# Patient Record
(demographics unavailable — no encounter records)

---

## 2025-01-24 NOTE — DISCUSSION/SUMMARY
[Normal Growth] : growth [Normal Development] : developmental [No Elimination Concerns] : elimination [Continue Regimen] : feeding [No Skin Concerns] : skin [Normal Sleep Pattern] : sleep [None] : no known medical problems [Anticipatory Guidance Given] : Anticipatory guidance addressed as per the history of present illness section [No Vaccines] : no vaccines needed [No Medications] : ~He/She~ is not on any medications [Parent/Guardian] : Parent/Guardian [FreeTextEntry1] : Recommend exclusive breastfeeding, 8-12 feedings per day.  Mother should continue prenatal vitamins and avoid alcohol. If formula is needed, recommend iron-fortified formulations every 2-3 hrs.  When in car, patient should be in rear-facing car seat in back seat.  Air dry umbilical stump.  Put baby to sleep on back, in own crib with no loose or soft bedding.  Limit baby's exposure to others, especially those with fever or unknown vaccine status. TCB= 9.6 F/u in 3 days for weight check, earlier with concerns

## 2025-01-24 NOTE — HISTORY OF PRESENT ILLNESS
[Born at ___ Wks Gestation] : The patient was born at [unfilled] weeks gestation [] : via normal spontaneous vaginal delivery [[facility]] : at [facility] [(1) _____] : [unfilled] [(5) _____] : [unfilled] [None] : There were no delivery complications [BW: _____] : weight of [unfilled] [Length: _____] : length of [unfilled] [HC: _____] : head circumference of [unfilled] [DW: _____] : Discharge weight was [unfilled] [Age: ___] : [unfilled] year old mother [G: ___] : G [unfilled] [P: ___] : P [unfilled] [Significant Hx: ____] : The mother's  medical history is significant for [unfilled] [Rubella (Immune)] : Rubella immune [GDM] : GDM [AMA] : AMA [TcB: _____] : Transcutaneous Bilirubin [unfilled] mg/dL [Phototherapy Threshold: _____] : Phototherapy level per Bilitool [unfilled] (mg/dL) [Yes] : Yes [Breast milk] : breast milk [Hours between feeds ___] : Child is fed every [unfilled] hours [Normal] : Normal [___ voids per day] : [unfilled] voids per day [Frequency of stools: ___] : Frequency of stools: [unfilled]  stools [per day] : per day. [Green/brown] : green/brown [Loose] : loose consistency [In Bassinet/Crib] : sleeps in bassinet/crib [On back] : sleeps on back [No] : No cigarette smoke exposure [Water heater temperature set at <120 degrees F] : Water heater temperature set at <120 degrees F [Rear facing car seat in back seat] : Rear facing car seat in back seat [Carbon Monoxide Detectors] : Carbon monoxide detectors at home [Smoke Detectors] : Smoke detectors at home. [Hepatitis B Vaccine Given] : Hepatitis B vaccine given [Nirsevimab Given] : Nirsevimab given  [NO] : No [RSV vaccine] : RSV vaccine not received by mother at least 14 days prior to delivery [HepBsAG] : HepBsAg negative [HIV] : HIV negative [HepC] : Hepatitis C negative [GBS] : GBS negative [VDRL/RPR (Reactive)] : VDRL/RPR nonreactive [] : negative [FreeTextEntry9] : B+ [de-identified] : 24 [Co-sleeping] : no co-sleeping [Loose bedding, pillow, toys, and/or bumpers in crib] : no loose bedding, pillow, toys, and/or bumpers in crib [Pacifier] : Not using pacifier [Exposure to electronic nicotine delivery system] : No exposure to electronic nicotine delivery system

## 2025-01-24 NOTE — PHYSICAL EXAM
[Alert] : alert [Normocephalic] : normocephalic [Flat Open Anterior [place]] : flat open anterior fontanelle [PERRL] : PERRL [Red Reflex Bilateral] : red reflex bilateral [Normally Placed Ears] : normally placed ears [Auricles Well Formed] : auricles well formed [Clear Tympanic membranes] : clear tympanic membranes [Light reflex present] : light reflex present [Bony structures visible] : bony structures visible [Patent Auditory Canal] : patent auditory canal [Nares Patent] : nares patent [Palate Intact] : palate intact [Uvula Midline] : uvula midline [Supple, full passive range of motion] : supple, full passive range of motion [Symmetric Chest Rise] : symmetric chest rise [Clear to Auscultation Bilaterally] : clear to auscultation bilaterally [Regular Rate and Rhythm] : regular rate and rhythm [S1, S2 present] : S1, S2 present [+2 Femoral Pulses] : +2 femoral pulses [Soft] : soft [Bowel Sounds] : bowel sounds present [Umbilical Stump Dry, Clean, Intact] : umbilical stump dry, clean, intact [Normal external genitailia] : normal external genitalia [Circumcised] : circumcised [Central Urethral Opening] : central urethral opening [Testicles Descended Bilaterally] : testicles descended bilaterally [Patent] : patent [Normally Placed] : normally placed [No Abnormal Lymph Nodes Palpated] : no abnormal lymph nodes palpated [Symmetric Flexed Extremities] : symmetric flexed extremities [Startle Reflex] : startle reflex present [Suck Reflex] : suck reflex present [Rooting] : rooting reflex present [Palmar Grasp] : palmar grasp present [Plantar Grasp] : plantar reflex present [Symmetric Maury] : symmetric [place] [Acute Distress] : no acute distress [Icteric sclera] : nonicteric sclera [Discharge] : no discharge [Palpable Masses] : no palpable masses [Murmurs] : no murmurs [Tender] : nontender [Distended] : not distended [Hepatomegaly] : no hepatomegaly [Splenomegaly] : no splenomegaly [Norwood-Ortolani] : negative Norwood-Ortolani [Spinal Dimple] : no spinal dimple [Tuft of Hair] : no tuft of hair [Jaundice] : jaundice [FreeTextEntry6] : circ WNL

## 2025-01-28 NOTE — HISTORY OF PRESENT ILLNESS
[de-identified] : f/u re: weight and feeding concerns [FreeTextEntry6] :  Pt her for recheck   diet: BF excl.  Some pain with latch and some engorgement issues   nl UO/BM    sleeps 3-4 hrs

## 2025-01-28 NOTE — PLAN
[TextEntry] : Feed on demand. Mom plans to f/u with telelactation services. Recheck 1 week. T/S Vit D. Plans to see uro to f/u re: h/o pyelectasis

## 2025-01-28 NOTE — HISTORY OF PRESENT ILLNESS
[de-identified] : f/u re: weight and feeding concerns [FreeTextEntry6] :  Pt her for recheck   diet: BF excl.  Some pain with latch and some engorgement issues   nl UO/BM    sleeps 3-4 hrs

## 2025-02-04 NOTE — HISTORY OF PRESENT ILLNESS
[de-identified] : f/u re: weight and feeding concerns [FreeTextEntry6] :  Pt here for recheck   diet: BF excl   Still some pain with latch   + TVS   nl UO/BM  s/p uro  U/S showed mild b/l HN. To f/u 3 mths

## 2025-02-04 NOTE — HISTORY OF PRESENT ILLNESS
[de-identified] : f/u re: weight and feeding concerns [FreeTextEntry6] :  Pt here for recheck   diet: BF excl   Still some pain with latch   + TVS   nl UO/BM  s/p uro  U/S showed mild b/l HN. To f/u 3 mths

## 2025-02-04 NOTE — PHYSICAL EXAM
[NL] : soft, nontender, nondistended, normal bowel sounds, no hepatosplenomegaly [de-identified] : + ling frenulum [FreeTextEntry6] : + left hydrocele [de-identified] : hips nl [de-identified] : no jaundice

## 2025-02-04 NOTE — PLAN
[TextEntry] : [facility] @ age 1 mth. Uro f/u as planned. Consider lactation consult. Disc indications for frenotomy

## 2025-02-04 NOTE — PHYSICAL EXAM
[NL] : soft, nontender, nondistended, normal bowel sounds, no hepatosplenomegaly [de-identified] : + ling frenulum [FreeTextEntry6] : + left hydrocele [de-identified] : hips nl [de-identified] : no jaundice

## 2025-02-04 NOTE — PLAN
[TextEntry] : [facility] @ age 1 mth. Uro f/u as planned. Consider lactation consult. Disc indications for frenotomy

## 2025-02-05 NOTE — PHYSICAL EXAM
[NL] : soft, nontender, nondistended, normal bowel sounds, no hepatosplenomegaly [FreeTextEntry6] : + reducible left ing swelling. + b/l hydrocele L>R

## 2025-02-05 NOTE — HISTORY OF PRESENT ILLNESS
[de-identified] : ? hernia [FreeTextEntry6] :  Pt seen yest for wt check appt. Pt with h/o hydrocele   Mom noted after OV a swelling in left groin. No clear pain. Eat nl. No V Pt has seen uro re: HN

## 2025-02-13 NOTE — CONSULT LETTER
[Dear  ___] : Dear  [unfilled], [Consult Letter:] : I had the pleasure of evaluating your patient, [unfilled]. [Please see my note below.] : Please see my note below. [Consult Closing:] : Thank you very much for allowing me to participate in the care of this patient.  If you have any questions, please do not hesitate to contact me. [Sincerely,] : Sincerely, [FreeTextEntry2] : Ger Vasquez MD [FreeTextEntry3] : Israel Romero MD Division of Pediatric, General, Thoracic and Endoscopic Surgery [facility]

## 2025-02-13 NOTE — ASSESSMENT
[FreeTextEntry1] : Blake is a 22 day old boy presenting with concerns of a left inguinal swelling. He is otherwise healthy and doing well without symptoms at this point. I counseled mom and dad and informed them that on exam today, the left testicle is retractile and comes down to the base of the scrotum; there is no obvious inguinal hernia defect appreciated. Given the history provided by the family, I reviewed the differentials including possible left inguinal hernia versus retractile testicle. I educated them about indirect/direct inguinal hernias as well as communicating/non-communicating hydroceles. I then reviewed the role of surgery to repair an inguinal hernia defect including a laparoscopic left, possible right, inguinal hernia repair, which would be performed should a hernia be identified in Blake's case. I discussed the indications, risks, benefits and alternatives to the procedure. The risks discussed included but were not limited to bleeding, infection, injury to intra-abdominal/pelvic contents, injury to spermatic cord/testicular loss, postoperative hydrocele and hernia recurrence. Postoperative expectations were discussed. I counseled them about the possibility of developing an incarcerated hernia and they know to bring Blake to the emergency room with any concerns. Mom and dad verbalized their understanding and we have agreed to monitor the groin moving forward and to have the family send me a photograph the next time they appreciate any obvious asymmetry in the left groin for me to assess if there is an obvious hernia present. They have agreed to proceed with surgical repair should a hernia be identified. They have my information and know to contact me sooner with any questions or concerns.

## 2025-02-13 NOTE — CONSULT LETTER
[Dear  ___] : Dear  [unfilled], [Consult Letter:] : I had the pleasure of evaluating your patient, [unfilled]. [Please see my note below.] : Please see my note below. [Consult Closing:] : Thank you very much for allowing me to participate in the care of this patient.  If you have any questions, please do not hesitate to contact me. [Sincerely,] : Sincerely, [FreeTextEntry2] : Ger Vasquez MD [FreeTextEntry3] : Israel Romero MD Division of Pediatric, General, Thoracic and Endoscopic Surgery [facility]

## 2025-02-13 NOTE — ADDENDUM
[FreeTextEntry1] : Documented by Octaviano Pickering acting as a scribe for Dr. Romero on 02/13/2025.   All medical record entries made by the Scribe were at my, Dr. Romero, direction and personally dictated by me on 02/13/2025. I have reviewed the chart and agree that the record accurately reflects my personal performances of the history, physical exam, assessment and plan. I have also personally directed, reviewed, and agree with the instructions.

## 2025-02-13 NOTE — PHYSICAL EXAM
[NL] : grossly intact [TextBox_67] : The left testicle is retractile, but comes down to the base of the scrotum, no obvious inguinal hernia defect identified

## 2025-02-13 NOTE — HISTORY OF PRESENT ILLNESS
[FreeTextEntry1] : Blake is a full term 22 day old baby boy here today to be evaluated for concerns of a left inguinal hernia. The parents note that concerns of a hydrocele were raised at birth and at their visit with the pediatrician, there was a left inguinal hernia that was reportedly reduced. Since then, the family has appreciated intermittent swelling in the right groin and deny any signs of incarceration. No swelling has been appreciated on the right side. Blake is otherwise feeding well without emesis. His bowel movements remain normal and he is making normal wet diapers. No recent fevers noted. To note, Blake was prenatally diagnosed with hydronephrosis and is followed by a urologist at an OSH.

## 2025-02-13 NOTE — CONSULT LETTER
[Dear  ___] : Dear  [unfilled], [Consult Letter:] : I had the pleasure of evaluating your patient, [unfilled]. [Please see my note below.] : Please see my note below. [Consult Closing:] : Thank you very much for allowing me to participate in the care of this patient.  If you have any questions, please do not hesitate to contact me. [Sincerely,] : Sincerely, [FreeTextEntry2] : Ger Vasquez MD [FreeTextEntry3] : Israel Romero MD Division of Pediatric, General, Thoracic and Endoscopic Surgery [facility]

## 2025-02-18 NOTE — REASON FOR VISIT
[Initial - Scheduled] : an initial, scheduled visit with concerns of [Inguinal Hernia] : inguinal hernia [Patient] : patient [Parents] : parents [Other: ____] : [unfilled]

## 2025-02-25 NOTE — PLAN
[TextEntry] : [facility] 1 mth. Disc diet, sleep, development. Uro f/u April re: HN. Surg f/u prn

## 2025-02-25 NOTE — HISTORY OF PRESENT ILLNESS
[Parents] : parents [Normal] : Normal [FreeTextEntry7] : s/p surg consult. IH not found- to monitor [de-identified] : BF+EBM (4-5 oz) [FreeTextEntry3] : 3 hrs

## 2025-02-25 NOTE — HISTORY OF PRESENT ILLNESS
[Parents] : parents [Normal] : Normal [FreeTextEntry7] : s/p surg consult. IH not found- to monitor [de-identified] : BF+EBM (4-5 oz) [FreeTextEntry3] : 3 hrs

## 2025-02-25 NOTE — PHYSICAL EXAM
[Alert] : alert [Acute Distress] : no acute distress [Normocephalic] : normocephalic [Flat Open Anterior [place]] : flat open anterior fontanelle [PERRL] : PERRL [Red Reflex Bilateral] : red reflex bilateral [Normally Placed Ears] : normally placed ears [Auricles Well Formed] : auricles well formed [Clear Tympanic membranes] : clear tympanic membranes [Light reflex present] : light reflex present [Bony landmarks visible] : bony landmarks visible [Discharge] : no discharge [Nares Patent] : nares patent [Palate Intact] : palate intact [Uvula Midline] : uvula midline [Supple, full passive range of motion] : supple, full passive range of motion [Palpable Masses] : no palpable masses [Symmetric Chest Rise] : symmetric chest rise [Clear to Auscultation Bilaterally] : clear to auscultation bilaterally [Regular Rate and Rhythm] : regular rate and rhythm [S1, S2 present] : S1, S2 present [Murmurs] : no murmurs [+2 Femoral Pulses] : +2 femoral pulses [Soft] : soft [Tender] : nontender [Distended] : not distended [Bowel Sounds] : bowel sounds present [Hepatomegaly] : no hepatomegaly [Splenomegaly] : no splenomegaly [Normal external genitailia] : normal external genitalia [Central Urethral Opening] : central urethral opening [Testicles Descended Bilaterally] : testicles descended bilaterally [Normally Placed] : normally placed [No Abnormal Lymph Nodes Palpated] : no abnormal lymph nodes palpated [Norwood-Ortolani] : negative [place]wood-Ortolani [Symmetric Flexed Extremities] : symmetric flexed extremities [Spinal Dimple] : no spinal dimple [Tuft of Hair] : no tuft of hair [Startle Reflex] : startle reflex present [Suck Reflex] : suck reflex present [Rooting] : rooting reflex present [Palmar Grasp] : palmar grasp reflex present [Plantar Grasp] : plantar grasp reflex present [Symmetric Maury] : symmetric [place] [Jaundice] : no jaundice [Rash and/or lesion present] : no rash/lesion [FreeTextEntry6] : no ing swelling present. + hydrocele R>L

## 2025-02-25 NOTE — PHYSICAL EXAM
[Alert] : alert [Acute Distress] : no acute distress [Normocephalic] : normocephalic [Flat Open Anterior [place]] : flat open anterior fontanelle [PERRL] : PERRL [Red Reflex Bilateral] : red reflex bilateral [Normally Placed Ears] : normally placed ears [Auricles Well Formed] : auricles well formed [Clear Tympanic membranes] : clear tympanic membranes [Light reflex present] : light reflex present [Bony landmarks visible] : bony landmarks visible [Discharge] : no discharge [Nares Patent] : nares patent [Palate Intact] : palate intact [Uvula Midline] : uvula midline [Supple, full passive range of motion] : supple, full passive range of motion [Palpable Masses] : no palpable masses [Symmetric Chest Rise] : symmetric chest rise [Clear to Auscultation Bilaterally] : clear to auscultation bilaterally [Regular Rate and Rhythm] : regular rate and rhythm [S1, S2 present] : S1, S2 present [Murmurs] : no murmurs [+2 Femoral Pulses] : +2 femoral pulses [Soft] : soft [Tender] : nontender [Distended] : not distended [Bowel Sounds] : bowel sounds present [Hepatomegaly] : no hepatomegaly [Splenomegaly] : no splenomegaly [Normal external genitailia] : normal external genitalia [Central Urethral Opening] : central urethral opening [Testicles Descended Bilaterally] : testicles descended bilaterally [Normally Placed] : normally placed [No Abnormal Lymph Nodes Palpated] : no abnormal lymph nodes palpated [Norwood-Ortolani] : negative Norwood-Ortolani [Symmetric Flexed Extremities] : symmetric flexed extremities [Spinal Dimple] : no spinal dimple [Tuft of Hair] : no tuft of hair [Startle Reflex] : startle reflex present [Suck Reflex] : suck reflex present [Rooting] : rooting reflex present [Palmar Grasp] : palmar grasp reflex present [Plantar Grasp] : plantar grasp reflex present [Symmetric Maury] : symmetric [place] [Jaundice] : no jaundice [Rash and/or lesion present] : no rash/lesion [FreeTextEntry6] : no ing swelling present. + hydrocele R>L

## 2025-02-25 NOTE — PLAN
[TextEntry] : [facility] 1 mth. Disc diet, sleep, development. Uro f/u April re: HN. Surg f/u prn

## 2025-03-25 NOTE — PLAN
[TextEntry] : Disc diet, sleep, development. Increase famotidine to 0.4 ml bid. Disc home neck stretching; to f/u in 1 mth if not better. Uro f/u in April as planned re: HN

## 2025-03-25 NOTE — PHYSICAL EXAM
[Alert] : alert [Acute Distress] : no acute distress [Normocephalic] : normocephalic [Flat Open Anterior [place]] : flat open anterior fontanelle [PERRL] : PERRL [Red Reflex Bilateral] : red reflex bilateral [Normally Placed Ears] : normally placed ears [Auricles Well Formed] : auricles well formed [Clear Tympanic membranes] : clear tympanic membranes [Light reflex present] : light reflex present [Bony landmarks visible] : bony landmarks visible [Discharge] : no discharge [Nares Patent] : nares patent [Palate Intact] : palate intact [Uvula Midline] : uvula midline [Supple, full passive range of motion] : supple, full passive range of motion [Palpable Masses] : no palpable masses [Symmetric Chest Rise] : symmetric chest rise [Clear to Auscultation Bilaterally] : clear to auscultation bilaterally [Regular Rate and Rhythm] : regular rate and rhythm [S1, S2 present] : S1, S2 present [Murmurs] : no murmurs [+2 Femoral Pulses] : +2 femoral pulses [Soft] : soft [Tender] : nontender [Distended] : not distended [Bowel Sounds] : bowel sounds present [Hepatomegaly] : no hepatomegaly [Splenomegaly] : no splenomegaly [Normal external genitailia] : normal external genitalia [Central Urethral Opening] : central urethral opening [Testicles Descended Bilaterally] : testicles descended bilaterally [Normally Placed] : normally placed [No Abnormal Lymph Nodes Palpated] : no abnormal lymph nodes palpated [Norwood-Ortolani] : negative [place]-Ortolani [Symmetric Flexed Extremities] : symmetric flexed extremities [Spinal Dimple] : no spinal dimple [Tuft of Hair] : no tuft of hair [Startle Reflex] : startle reflex present [Rooting] : rooting reflex present [Suck Reflex] : suck reflex present [Palmar Grasp] : palmar grasp reflex present [Plantar Grasp] : plantar grasp reflex present [Symmetric Maury] : symmetric [place] [Rash and/or lesion present] : no rash/lesion [FreeTextEntry2] : mild right PO flattening

## 2025-03-25 NOTE — HISTORY OF PRESENT ILLNESS
[Mother] : mother [Normal] : Normal [de-identified] : EBM 4-5 oz. Rare BF [de-identified] : Mom does report right sided neck preference [FreeTextEntry3] : 2.5-3.5 hrs [FreeTextEntry1] :  -h/o GERD. Sx's better since starting famotidine -no recurrence of inguinal bulge

## 2025-03-25 NOTE — PHYSICAL EXAM
[Alert] : alert [Acute Distress] : no acute distress [Normocephalic] : normocephalic [Flat Open Anterior [place]] : flat open anterior fontanelle [PERRL] : PERRL [Red Reflex Bilateral] : red reflex bilateral [Normally Placed Ears] : normally placed ears [Auricles Well Formed] : auricles well formed [Clear Tympanic membranes] : clear tympanic membranes [Light reflex present] : light reflex present [Bony landmarks visible] : bony landmarks visible [Discharge] : no discharge [Nares Patent] : nares patent [Palate Intact] : palate intact [Uvula Midline] : uvula midline [Supple, full passive range of motion] : supple, full passive range of motion [Palpable Masses] : no palpable masses [Symmetric Chest Rise] : symmetric chest rise [Regular Rate and Rhythm] : regular rate and rhythm [Clear to Auscultation Bilaterally] : clear to auscultation bilaterally [S1, S2 present] : S1, S2 present [Murmurs] : no murmurs [+2 Femoral Pulses] : +2 femoral pulses [Soft] : soft [Tender] : nontender [Distended] : not distended [Bowel Sounds] : bowel sounds present [Hepatomegaly] : no hepatomegaly [Splenomegaly] : no splenomegaly [Normal external genitailia] : normal external genitalia [Central Urethral Opening] : central urethral opening [Testicles Descended Bilaterally] : testicles descended bilaterally [Normally Placed] : normally placed [No Abnormal Lymph Nodes Palpated] : no abnormal lymph nodes palpated [Norwood-Ortolani] : negative [place]wood-Ortolani [Symmetric Flexed Extremities] : symmetric flexed extremities [Spinal Dimple] : no spinal dimple [Tuft of Hair] : no tuft of hair [Startle Reflex] : startle reflex present [Suck Reflex] : suck reflex present [Rooting] : rooting reflex present [Palmar Grasp] : palmar grasp reflex present [Plantar Grasp] : plantar grasp reflex present [Symmetric Maury] : symmetric [place] [Rash and/or lesion present] : no rash/lesion [FreeTextEntry2] : mild right PO flattening

## 2025-03-25 NOTE — HISTORY OF PRESENT ILLNESS
DPD lab not back yet - likely will not be back until next week.     Reviewed with Dr. Friedell. We would prefer not to delay another week to wait on this.   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 Keep leucovorin. Keep oxaliplatin full-dose. Will add Neulasta on-pro (will get day 2).     Called pt to review plan, he's in agreement.   He is doing well this week. No concerns.   /80s on his full-dose antihypertensives.     He will come in tomorrow for labs + C2. He will come in day 4 for IVF.   Advised Claritin for possible neulasta musculoskeletal side effects.   Advised he pre-emptively start antimetics day 3 and take scheduled for a few days, PRN thereafter. Imodium for diarrhea.  Push fluids/calories and call in or go to ED with any recurrent hypotension/dehdyration.  He has parameters for his antihypertensive dosing/when to hold by his PCP, he will monitor BP twice/day through cycle.     Return in 2 weeks with me ahead of C3. If his DPD testing turns out to not be deficient and he does well this cycle, will consider going back up on the infusional 5FU dose +/- reintroducing the 5FU bolus with next cycle    [Mother] : mother [Normal] : Normal [de-identified] : EBM 4-5 oz. Rare BF [de-identified] : Mom does report right sided neck preference [FreeTextEntry3] : 2.5-3.5 hrs [FreeTextEntry1] :  -h/o GERD. Sx's better since starting famotidine -no recurrence of inguinal bulge

## 2025-05-22 NOTE — DEVELOPMENTAL MILESTONES
[FreeTextEntry1] : + push up, does not roll. laughs. + open hand [Passed] : passed [FreeTextEntry2] : 3

## 2025-05-22 NOTE — HISTORY OF PRESENT ILLNESS
[Mother] : mother [Formula ___ oz/feed] : [unfilled] oz of formula per feed [Normal] : Normal [FreeTextEntry3] : eats x 1 [FreeTextEntry1] :  -h/o giovanni HN. Due for uro f/u  has periodic swelling of scrotum c/w hydrocele -GERD sx's better -head flattening improved. neck movement symmetric

## 2025-05-22 NOTE — PHYSICAL EXAM
[Alert] : alert [Normocephalic] : normocephalic [Flat Open Anterior [place]] : flat open anterior fontanelle [Red Reflex] : red reflex bilateral [PERRL] : PERRL [Normally Placed Ears] : normally placed ears [Auricles Well Formed] : auricles well formed [Clear Tympanic membranes] : clear tympanic membranes [Light reflex present] : light reflex present [Bony landmarks visible] : bony landmarks visible [Nares Patent] : nares patent [Palate Intact] : palate intact [Uvula Midline] : uvula midline [Symmetric Chest Rise] : symmetric chest rise [Clear to Auscultation Bilaterally] : clear to auscultation bilaterally [Regular Rate and Rhythm] : regular rate and rhythm [S1, S2 present] : S1, S2 present [+2 Femoral Pulses] : (+) 2 femoral pulses [Soft] : soft [Bowel Sounds] : bowel sounds present [Central Urethral Opening] : central urethral opening [Testicles Descended] : testicles descended bilaterally [Patent] : patent [Normally Placed] : normally placed [No Abnormal Lymph Nodes Palpated] : no abnormal lymph nodes palpated [Startle Reflex] : startle reflex present [Plantar Grasp] : plantar grasp reflex present [Symmetric Maury] : symmetric maury [Acute Distress] : no acute distress [Discharge] : no discharge [Palpable Masses] : no palpable masses [Murmurs] : no murmurs [Tender] : nontender [Distended] : nondistended [Hepatomegaly] : no hepatomegaly [Splenomegaly] : no splenomegaly [Norwood-Ortolani] : negative Norwood-Ortolani [Allis Sign] : negative Allis sign [Spinal Dimple] : no spinal dimple [Tuft of Hair] : no tuft of hair [Rash or Lesions] : no rash/lesions [FreeTextEntry2] : minimal right PO flattening

## 2025-05-22 NOTE — PLAN
[TextEntry] : [facility] 2 mths. Disc diet, sleep, development. Uro as planned. Trial weaning off famotidine

## 2025-07-29 NOTE — HISTORY OF PRESENT ILLNESS
[Mother] : mother [Normal] : Normal [de-identified] : GERD better. Stopped famotidine [de-identified] : F 30 oz. 2 meals [de-identified] : thru [FreeTextEntry1] :  -h/o HN and hydrocele   Due for uro f/u @ age 1 yr

## 2025-07-29 NOTE — PLAN
[TextEntry] : [facility] 3 mths. Disc diet, sleep, development, oral health, safety. Uro f/u as planned

## 2025-07-29 NOTE — PHYSICAL EXAM
[Alert] : alert [Acute Distress] : no acute distress [Normocephalic] : normocephalic [Flat Open Anterior [place]] : flat open anterior fontanelle [Red Reflex] : red reflex bilateral [PERRL] : PERRL [Normally Placed Ears] : normally placed ears [Auricles Well Formed] : auricles well formed [Clear Tympanic membranes] : clear tympanic membranes [Light reflex present] : light reflex present [Bony landmarks visible] : bony landmarks visible [Discharge] : no discharge [Nares Patent] : nares patent [Palate Intact] : palate intact [Uvula Midline] : uvula midline [Tooth Eruption] : no tooth eruption [Supple, full passive range of motion] : supple, full passive range of motion [Palpable Masses] : no palpable masses [Symmetric Chest Rise] : symmetric chest rise [Clear to Auscultation Bilaterally] : clear to auscultation bilaterally [Regular Rate and Rhythm] : regular rate and rhythm [S1, S2 present] : S1, S2 present [Murmurs] : no murmurs [+2 Femoral Pulses] : (+) 2 femoral pulses [Soft] : soft [Tender] : nontender [Distended] : nondistended [Bowel Sounds] : bowel sounds present [Hepatomegaly] : no hepatomegaly [Splenomegaly] : no splenomegaly [Central Urethral Opening] : central urethral opening [Testicles Descended] : testicles descended bilaterally [Patent] : patent [Normally Placed] : normally placed [No Abnormal Lymph Nodes Palpated] : no abnormal lymph nodes palpated [Norwood-Ortolani] : negative Norwood-Ortolani [Allis Sign] : negative Allis sign [Symmetric Buttocks Creases] : symmetric buttocks creases [Spinal Dimple] : no spinal dimple [Tuft of Hair] : no tuft of hair [Plantar Grasp] : plantar grasp reflex present [Cranial Nerves Grossly Intact] : cranial nerves grossly intact [Rash or Lesions] : no rash/lesions [de-identified] : no significant flattening [de-identified] : hydrocele R>L

## 2025-07-29 NOTE — PHYSICAL EXAM
[Alert] : alert [Acute Distress] : no acute distress [Normocephalic] : normocephalic [Flat Open Anterior [place]] : flat open anterior fontanelle [Red Reflex] : red reflex bilateral [PERRL] : PERRL [Normally Placed Ears] : normally placed ears [Auricles Well Formed] : auricles well formed [Clear Tympanic membranes] : clear tympanic membranes [Light reflex present] : light reflex present [Bony landmarks visible] : bony landmarks visible [Discharge] : no discharge [Nares Patent] : nares patent [Palate Intact] : palate intact [Uvula Midline] : uvula midline [Tooth Eruption] : no tooth eruption [Supple, full passive range of motion] : supple, full passive range of motion [Palpable Masses] : no palpable masses [Symmetric Chest Rise] : symmetric chest rise [Clear to Auscultation Bilaterally] : clear to auscultation bilaterally [Regular Rate and Rhythm] : regular rate and rhythm [S1, S2 present] : S1, S2 present [Murmurs] : no murmurs [+2 Femoral Pulses] : (+) 2 femoral pulses [Soft] : soft [Tender] : nontender [Distended] : nondistended [Bowel Sounds] : bowel sounds present [Hepatomegaly] : no hepatomegaly [Splenomegaly] : no splenomegaly [Central Urethral Opening] : central urethral opening [Testicles Descended] : testicles descended bilaterally [Patent] : patent [Normally Placed] : normally placed [No Abnormal Lymph Nodes Palpated] : no abnormal lymph nodes palpated [Norwood-Ortolani] : negative Norwood-Ortolani [Allis Sign] : negative Allis sign [Symmetric Buttocks Creases] : symmetric buttocks creases [Spinal Dimple] : no spinal dimple [Tuft of Hair] : no tuft of hair [Plantar Grasp] : plantar grasp reflex present [Cranial Nerves Grossly Intact] : cranial nerves grossly intact [Rash or Lesions] : no rash/lesions [de-identified] : no significant flattening [de-identified] : hydrocele R>L

## 2025-07-29 NOTE — PLAN
[TextEntry] : [facility] 3 mths. Disc diet, sleep, development, oral health, safety. Uro f/u as planned

## 2025-07-29 NOTE — HISTORY OF PRESENT ILLNESS
[Mother] : mother [Normal] : Normal [de-identified] : GERD better. Stopped famotidine [de-identified] : F 30 oz. 2 meals [de-identified] : thru [FreeTextEntry1] :  -h/o HN and hydrocele   Due for uro f/u @ age 1 yr